# Patient Record
Sex: FEMALE | Race: WHITE | NOT HISPANIC OR LATINO | Employment: FULL TIME | URBAN - METROPOLITAN AREA
[De-identification: names, ages, dates, MRNs, and addresses within clinical notes are randomized per-mention and may not be internally consistent; named-entity substitution may affect disease eponyms.]

---

## 2023-12-20 ENCOUNTER — APPOINTMENT (OUTPATIENT)
Dept: PRIMARY CARE | Facility: CLINIC | Age: 23
End: 2023-12-20
Payer: COMMERCIAL

## 2024-01-03 ASSESSMENT — PROMIS GLOBAL HEALTH SCALE
RATE_GENERAL_HEALTH: VERY GOOD
RATE_QUALITY_OF_LIFE: EXCELLENT
CARRYOUT_SOCIAL_ACTIVITIES: VERY GOOD
RATE_SOCIAL_SATISFACTION: GOOD
RATE_AVERAGE_PAIN: 2
RATE_MENTAL_HEALTH: GOOD
EMOTIONAL_PROBLEMS: OFTEN
RATE_PHYSICAL_HEALTH: VERY GOOD
RATE_AVERAGE_FATIGUE: MILD
CARRYOUT_PHYSICAL_ACTIVITIES: COMPLETELY

## 2024-01-04 ENCOUNTER — LAB (OUTPATIENT)
Dept: LAB | Facility: LAB | Age: 24
End: 2024-01-04
Payer: COMMERCIAL

## 2024-01-04 ENCOUNTER — OFFICE VISIT (OUTPATIENT)
Dept: PRIMARY CARE | Facility: CLINIC | Age: 24
End: 2024-01-04
Payer: COMMERCIAL

## 2024-01-04 ENCOUNTER — TELEPHONE (OUTPATIENT)
Dept: PRIMARY CARE | Facility: CLINIC | Age: 24
End: 2024-01-04

## 2024-01-04 VITALS
RESPIRATION RATE: 18 BRPM | DIASTOLIC BLOOD PRESSURE: 72 MMHG | BODY MASS INDEX: 29.82 KG/M2 | SYSTOLIC BLOOD PRESSURE: 128 MMHG | TEMPERATURE: 98.1 F | OXYGEN SATURATION: 98 % | WEIGHT: 190 LBS | HEART RATE: 90 BPM | HEIGHT: 67 IN

## 2024-01-04 DIAGNOSIS — M79.645 PAIN OF LEFT THUMB: Primary | ICD-10-CM

## 2024-01-04 DIAGNOSIS — F31.81 BIPOLAR 2 DISORDER (MULTI): ICD-10-CM

## 2024-01-04 DIAGNOSIS — Z00.00 ANNUAL PHYSICAL EXAM: Primary | ICD-10-CM

## 2024-01-04 DIAGNOSIS — E78.2 MIXED HYPERCHOLESTEROLEMIA AND HYPERTRIGLYCERIDEMIA: ICD-10-CM

## 2024-01-04 DIAGNOSIS — M79.645 PAIN OF LEFT THUMB: ICD-10-CM

## 2024-01-04 DIAGNOSIS — R53.83 FATIGUE, UNSPECIFIED TYPE: ICD-10-CM

## 2024-01-04 DIAGNOSIS — T14.8XXA AVULSION FRACTURE: ICD-10-CM

## 2024-01-04 DIAGNOSIS — Z11.59 NEED FOR HEPATITIS C SCREENING TEST: ICD-10-CM

## 2024-01-04 DIAGNOSIS — Z23 ENCOUNTER FOR IMMUNIZATION: ICD-10-CM

## 2024-01-04 DIAGNOSIS — L70.0 ACNE VULGARIS: ICD-10-CM

## 2024-01-04 DIAGNOSIS — Z01.89 ENCOUNTER FOR ROUTINE LABORATORY TESTING: ICD-10-CM

## 2024-01-04 LAB
ANION GAP SERPL CALC-SCNC: 12 MMOL/L
BASOPHILS # BLD AUTO: 0.02 X10*3/UL (ref 0–0.1)
BASOPHILS NFR BLD AUTO: 0.3 %
BUN SERPL-MCNC: 11 MG/DL (ref 8–25)
CALCIUM SERPL-MCNC: 9.4 MG/DL (ref 8.5–10.4)
CHLORIDE SERPL-SCNC: 103 MMOL/L (ref 97–107)
CHOLEST SERPL-MCNC: 214 MG/DL (ref 133–200)
CHOLEST/HDLC SERPL: 3.2 {RATIO}
CO2 SERPL-SCNC: 24 MMOL/L (ref 24–31)
CREAT SERPL-MCNC: 0.9 MG/DL (ref 0.4–1.6)
EOSINOPHIL # BLD AUTO: 0.19 X10*3/UL (ref 0–0.7)
EOSINOPHIL NFR BLD AUTO: 2.9 %
ERYTHROCYTE [DISTWIDTH] IN BLOOD BY AUTOMATED COUNT: 12.5 % (ref 11.5–14.5)
GFR SERPL CREATININE-BSD FRML MDRD: >90 ML/MIN/1.73M*2
GLUCOSE SERPL-MCNC: 100 MG/DL (ref 65–99)
HCT VFR BLD AUTO: 40.6 % (ref 36–46)
HDLC SERPL-MCNC: 66 MG/DL
HGB BLD-MCNC: 13.2 G/DL (ref 12–16)
IMM GRANULOCYTES # BLD AUTO: 0.01 X10*3/UL (ref 0–0.7)
IMM GRANULOCYTES NFR BLD AUTO: 0.2 % (ref 0–0.9)
LDLC SERPL CALC-MCNC: 114 MG/DL (ref 65–130)
LYMPHOCYTES # BLD AUTO: 2.11 X10*3/UL (ref 1.2–4.8)
LYMPHOCYTES NFR BLD AUTO: 32.7 %
MCH RBC QN AUTO: 29.1 PG (ref 26–34)
MCHC RBC AUTO-ENTMCNC: 32.5 G/DL (ref 32–36)
MCV RBC AUTO: 89 FL (ref 80–100)
MONOCYTES # BLD AUTO: 0.34 X10*3/UL (ref 0.1–1)
MONOCYTES NFR BLD AUTO: 5.3 %
NEUTROPHILS # BLD AUTO: 3.78 X10*3/UL (ref 1.2–7.7)
NEUTROPHILS NFR BLD AUTO: 58.6 %
NRBC BLD-RTO: 0 /100 WBCS (ref 0–0)
PLATELET # BLD AUTO: 248 X10*3/UL (ref 150–450)
POTASSIUM SERPL-SCNC: 4.7 MMOL/L (ref 3.4–5.1)
RBC # BLD AUTO: 4.54 X10*6/UL (ref 4–5.2)
SODIUM SERPL-SCNC: 139 MMOL/L (ref 133–145)
TRIGL SERPL-MCNC: 169 MG/DL (ref 40–150)
WBC # BLD AUTO: 6.5 X10*3/UL (ref 4.4–11.3)

## 2024-01-04 PROCEDURE — 36415 COLL VENOUS BLD VENIPUNCTURE: CPT

## 2024-01-04 PROCEDURE — 80061 LIPID PANEL: CPT

## 2024-01-04 PROCEDURE — 99395 PREV VISIT EST AGE 18-39: CPT | Performed by: NURSE PRACTITIONER

## 2024-01-04 PROCEDURE — 1036F TOBACCO NON-USER: CPT | Performed by: NURSE PRACTITIONER

## 2024-01-04 PROCEDURE — 85025 COMPLETE CBC W/AUTO DIFF WBC: CPT

## 2024-01-04 PROCEDURE — 90471 IMMUNIZATION ADMIN: CPT | Performed by: NURSE PRACTITIONER

## 2024-01-04 PROCEDURE — 80048 BASIC METABOLIC PNL TOTAL CA: CPT

## 2024-01-04 PROCEDURE — 86803 HEPATITIS C AB TEST: CPT

## 2024-01-04 RX ORDER — SPIRONOLACTONE 100 MG/1
100 TABLET, FILM COATED ORAL DAILY
COMMUNITY
Start: 2023-10-28 | End: 2024-01-04 | Stop reason: SDUPTHER

## 2024-01-04 RX ORDER — SPIRONOLACTONE 100 MG/1
100 TABLET, FILM COATED ORAL DAILY
Qty: 90 TABLET | Refills: 3 | Status: SHIPPED | OUTPATIENT
Start: 2024-01-04 | End: 2024-04-16 | Stop reason: WASHOUT

## 2024-01-04 RX ORDER — DROSPIRENONE AND ETHINYL ESTRADIOL 0.02-3(28)
1 KIT ORAL DAILY
COMMUNITY
Start: 2023-10-22 | End: 2024-02-29 | Stop reason: WASHOUT

## 2024-01-04 RX ORDER — LAMOTRIGINE 100 MG/1
100 TABLET ORAL DAILY
Qty: 90 TABLET | Refills: 1 | Status: SHIPPED | OUTPATIENT
Start: 2024-01-04 | End: 2024-07-02

## 2024-01-04 RX ORDER — LAMOTRIGINE 100 MG/1
100 TABLET ORAL DAILY
COMMUNITY
Start: 2023-07-31 | End: 2024-01-04 | Stop reason: SDUPTHER

## 2024-01-04 RX ORDER — LAMOTRIGINE 100 MG/1
1 TABLET ORAL DAILY
COMMUNITY
Start: 2023-12-04 | End: 2024-01-03 | Stop reason: WASHOUT

## 2024-01-04 RX ORDER — FLUOXETINE HYDROCHLORIDE 20 MG/1
20 CAPSULE ORAL DAILY
Qty: 90 CAPSULE | Refills: 1 | Status: SHIPPED | OUTPATIENT
Start: 2024-01-04 | End: 2024-07-02

## 2024-01-04 RX ORDER — FLUOXETINE HYDROCHLORIDE 20 MG/1
20 CAPSULE ORAL DAILY
COMMUNITY
End: 2024-01-04 | Stop reason: SDUPTHER

## 2024-01-04 ASSESSMENT — ENCOUNTER SYMPTOMS
SHORTNESS OF BREATH: 0
BRUISES/BLEEDS EASILY: 0
BLOOD IN STOOL: 0
COUGH: 0
PALPITATIONS: 0
APPETITE CHANGE: 0
DIZZINESS: 0
CHILLS: 0
CONFUSION: 0
DIAPHORESIS: 0
FATIGUE: 0
HEADACHES: 0
CHEST TIGHTNESS: 0
LOSS OF SENSATION IN FEET: 0
HEMATURIA: 0
DYSURIA: 0
DEPRESSION: 0
AGITATION: 0
WOUND: 0
ABDOMINAL PAIN: 0
VOMITING: 0
SPEECH DIFFICULTY: 0
BACK PAIN: 0
NECK PAIN: 0
NAUSEA: 0
SEIZURES: 0
OCCASIONAL FEELINGS OF UNSTEADINESS: 0
POLYDIPSIA: 0
ADENOPATHY: 0
POLYPHAGIA: 0
FLANK PAIN: 0
FEVER: 0
WHEEZING: 0

## 2024-01-04 ASSESSMENT — PATIENT HEALTH QUESTIONNAIRE - PHQ9
SUM OF ALL RESPONSES TO PHQ9 QUESTIONS 1 AND 2: 0
1. LITTLE INTEREST OR PLEASURE IN DOING THINGS: NOT AT ALL
2. FEELING DOWN, DEPRESSED OR HOPELESS: NOT AT ALL

## 2024-01-04 ASSESSMENT — PAIN SCALES - GENERAL: PAINLEVEL: 6

## 2024-01-04 NOTE — PROGRESS NOTES
[facility]: [facility]  PHYSICAL EXAM      Neelima Cordova is a 23 y.o. female that is presenting today for Annual Exam.    Patient reports she was diagnosed with Bipolar 2 while living in [place]. She is inquiring about referral to psychiatry to continue follow up.    Assessment/Plan   Diagnoses and all orders for this visit:  Annual physical exam  Encounter for immunization  -     Tdap vaccine, age 7 years and older  Need for hepatitis C screening test  -     Hepatitis C antibody; Future  Mixed hypercholesterolemia and hypertriglyceridemia  -     Lipid Panel; Future  Fatigue, unspecified type  -     CBC and Auto Differential; Future  -     Basic Metabolic Panel; Future  Bipolar 2 disorder (CMS/HCC)  -     continue FLUoxetine (PROzac) 20 mg capsule; Take 1 capsule (20 mg) by mouth once daily.  -     continue lamoTRIgine (LaMICtal) 100 mg tablet; Take 1 tablet (100 mg) by mouth once daily.  -     Referral to Psychiatry; Future  Acne vulgaris  -     continue spironolactone (Aldactone) 100 mg tablet; Take 1 tablet (100 mg) by mouth once daily.  Encounter for routine laboratory testing  -     CBC and Auto Differential; Future  -     Basic Metabolic Panel; Future  -     Lipid Panel; Future  Pain of left thumb  -     XR hand left 1-2 views; Future  -     Referral to Orthopaedic Surgery; Future  Other orders  -     Follow Up In Primary Care - Health Maintenance; Future  Subjective   Patient reports gradual onset of left thumb pain over the past 2 months with progressive worsening. Denies trauma or injury. Pain is at base of thumb and worsens with movement, ROM limited due to pain. She has tried OTC Advil occasionally without relief.      Review of Systems   Constitutional:  Negative for appetite change, chills, diaphoresis, fatigue and fever.   HENT:  Negative for hearing loss and mouth sores.    Eyes:  Negative for visual disturbance.   Respiratory:  Negative for cough, chest tightness,  shortness of breath and wheezing.    Cardiovascular:  Negative for chest pain, palpitations and leg swelling.   Gastrointestinal:  Negative for abdominal pain, blood in stool, nausea and vomiting.   Endocrine: Negative for cold intolerance, heat intolerance, polydipsia, polyphagia and polyuria.   Genitourinary:  Negative for dysuria, flank pain and hematuria.   Musculoskeletal:  Negative for back pain and neck pain.   Skin:  Negative for rash and wound.   Allergic/Immunologic: Negative for food allergies and immunocompromised state.   Neurological:  Negative for dizziness, seizures, syncope, speech difficulty and headaches.   Hematological:  Negative for adenopathy. Does not bruise/bleed easily.   Psychiatric/Behavioral:  Negative for agitation and confusion.       Objective   Vitals:    01/04/24 1456   BP: 128/72   Pulse: 90   Resp: 18   Temp: 36.7 °C (98.1 °F)   SpO2: 98%     Body mass index is 29.76 kg/m².  Physical Exam  Vitals and nursing note reviewed.   Constitutional:       General: She is not in acute distress.     Appearance: Normal appearance. She is not ill-appearing.   HENT:      Head: Normocephalic and atraumatic.      Right Ear: Tympanic membrane, ear canal and external ear normal. There is no impacted cerumen.      Left Ear: Tympanic membrane, ear canal and external ear normal. There is no impacted cerumen.      Nose: Nose normal.      Mouth/Throat:      Mouth: Mucous membranes are moist.      Pharynx: Oropharynx is clear. No oropharyngeal exudate or posterior oropharyngeal erythema.   Eyes:      General: No scleral icterus.        Right eye: No discharge.         Left eye: No discharge.      Extraocular Movements: Extraocular movements intact.      Conjunctiva/sclera: Conjunctivae normal.      Pupils: Pupils are equal, round, and reactive to light.   Neck:      Vascular: No carotid bruit.   Cardiovascular:      Rate and Rhythm: Normal rate and regular rhythm.      Pulses: Normal pulses.      Heart  "sounds: Normal heart sounds. No murmur heard.     No friction rub. No gallop.   Pulmonary:      Effort: Pulmonary effort is normal. No respiratory distress.      Breath sounds: Normal breath sounds.   Abdominal:      General: Abdomen is flat. Bowel sounds are normal. There is no distension.      Palpations: Abdomen is soft.      Tenderness: There is no abdominal tenderness.      Hernia: No hernia is present.   Musculoskeletal:         General: Tenderness present. No swelling, deformity or signs of injury.      Right hand: Normal.      Left hand: Tenderness and bony tenderness present. No swelling, deformity or lacerations. Decreased range of motion. Decreased strength of finger abduction. Normal sensation. Normal capillary refill. Normal pulse.        Arms:       Comments: Pain and tenderness at left MCP joint. Limited ROM and decreased strength with    Lymphadenopathy:      Cervical: No cervical adenopathy.   Skin:     General: Skin is warm and dry.      Coloration: Skin is not jaundiced.      Findings: No rash.   Neurological:      General: No focal deficit present.      Mental Status: She is alert and oriented to person, place, and time. Mental status is at baseline.   Psychiatric:         Mood and Affect: Mood normal.         Behavior: Behavior normal.       Diagnostic Results   Lab Results   Component Value Date    GLUCOSE 94 11/01/2021    CALCIUM 9.3 11/01/2021     11/01/2021    K 4.2 11/01/2021    CO2 23 (L) 11/01/2021     11/01/2021    BUN 11 11/01/2021    CREATININE 0.8 11/01/2021     No results found for: \"ALT\", \"AST\", \"GGT\", \"ALKPHOS\", \"BILITOT\"  Lab Results   Component Value Date    WBC 7.6 11/01/2021    HGB 14.0 11/01/2021    HCT 43.1 11/01/2021    MCV 88.9 11/01/2021     11/01/2021     No results found for: \"CHOL\"  No results found for: \"HDL\"  No results found for: \"LDLCALC\"  No results found for: \"TRIG\"  No components found for: \"CHOLHDL\"  No results found for: \"HGBA1C\"  Other " labs not included in the list above were reviewed either before or during this encounter.    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    Piriformis syndrome of left side    New daily persistent headache (ndph) 02/13/2017    New daily persistent headache    Other conditions influencing health status     Normal menstrual period    Other conditions influencing health status 05/01/2017    History of cough    Pain in thoracic spine 08/11/2017    Pain in thoracic spine    Personal history of diseases of the blood and blood-forming organs and certain disorders involving the immune mechanism 11/04/2017    History of iron deficiency anemia    Personal history of other (healed) physical injury and trauma 08/18/2017    History of strain of back    Personal history of other diseases of the digestive system 04/20/2015    History of constipation    Personal history of other diseases of the female genital tract 05/25/2016    History of abnormal uterine bleeding    Personal history of other diseases of the female genital tract 12/12/2016    History of vaginal pruritus    Personal history of other diseases of the female genital tract 01/19/2016    History of menorrhagia    Personal history of other diseases of the musculoskeletal system and connective tissue 10/05/2017    History of low back pain    Personal history of other diseases of the nervous system and sense organs 01/30/2015    History of acute conjunctivitis    Personal history of  other diseases of the respiratory system 11/20/2015    History of sore throat    Personal history of other infectious and parasitic diseases 12/12/2016    History of candidal vulvovaginitis    Personal history of other specified conditions 05/01/2017    History of nasal congestion    Personal history of other specified conditions 12/12/2016    History of dysuria    Personal history of other specified conditions 12/15/2016    History of nausea    Personal history of other specified conditions 12/18/2015    History of fatigue    Pneumonia, unspecified organism 10/16/2016    Community acquired pneumonia    Presence of spectacles and contact lenses     Wears glasses    Segmental and somatic dysfunction of abdomen and other regions 08/11/2017    Segmental and somatic dysfunction of abdomen and other regions    Segmental and somatic dysfunction of cervical region 08/11/2017    Cervical somatic dysfunction    Segmental and somatic dysfunction of head region 08/11/2017    Cranial somatic dysfunction    Segmental and somatic dysfunction of rib cage 10/05/2017    Rib cage region somatic dysfunction    Segmental and somatic dysfunction of sacral region 10/05/2017    Segmental and somatic dysfunction of sacral region    Segmental and somatic dysfunction of thoracic region 10/05/2017    Segmental and somatic dysfunction of thoracic region    Segmental and somatic dysfunction of upper extremity 10/05/2017    Segmental and somatic dysfunction of upper extremity    Spondylosis without myelopathy or radiculopathy, lumbar region 10/02/2014    Facet syndrome, lumbar    Spondylosis without myelopathy or radiculopathy, lumbosacral region 05/16/2017    Lumbosacral spondylosis    Toxic effect of venom of bees, accidental (unintentional), initial encounter 06/23/2016    Bee sting reaction    Unspecified injury of left lower leg, initial encounter 09/26/2016    Knee injury, left, initial encounter     Past Surgical History:   Procedure  Laterality Date    OTHER SURGICAL HISTORY  12/31/2019    No history of surgery     No family history on file.  Social History     Socioeconomic History    Marital status: Single     Spouse name: Not on file    Number of children: Not on file    Years of education: Not on file    Highest education level: Not on file   Occupational History    Not on file   Tobacco Use    Smoking status: Never    Smokeless tobacco: Never   Substance and Sexual Activity    Alcohol use: Not on file    Drug use: Not on file    Sexual activity: Not on file   Other Topics Concern    Not on file   Social History Narrative    Not on file     Social Determinants of Health     Financial Resource Strain: Not on file   Food Insecurity: Not on file   Transportation Needs: Not on file   Physical Activity: Not on file   Stress: Not on file   Social Connections: Not on file   Intimate Partner Violence: Not on file   Housing Stability: Not on file     Allergies   Allergen Reactions    Benadryl Allergy Decongestant Anaphylaxis     Current Outpatient Medications on File Prior to Visit   Medication Sig Dispense Refill    FLUoxetine (PROzac) 20 mg capsule Take 1 capsule (20 mg) by mouth once daily.      lamoTRIgine (LaMICtal) 100 mg tablet Take 1 tablet (100 mg) by mouth once daily.      Taylor, 28, 3-0.02 mg tablet Take 1 tablet by mouth once daily.      spironolactone (Aldactone) 100 mg tablet Take 1 tablet (100 mg) by mouth once daily.      [DISCONTINUED] lamoTRIgine (LaMICtal) 100 mg tablet Take 1 tablet (100 mg) by mouth once daily.       No current facility-administered medications on file prior to visit.       There is no immunization history on file for this patient.  Patient's medical history was reviewed and updated either before or during this encounter.       Silvia Garcia, APRN-CNP

## 2024-01-04 NOTE — PROGRESS NOTES
"[facility]: [facility]  PROGRESS NOTE      Neelima Cordova is a 23 y.o. female that is presenting today for Annual Exam.    Assessment/Plan     Subjective   HPI  Review of Systems   Objective   Vitals:    01/04/24 1456   BP: 128/72   Pulse: 90   Resp: 18   Temp: 36.7 °C (98.1 °F)   SpO2: 98%      Body mass index is 29.76 kg/m².  Physical Exam  Diagnostic Results   Lab Results   Component Value Date    GLUCOSE 94 11/01/2021    CALCIUM 9.3 11/01/2021     11/01/2021    K 4.2 11/01/2021    CO2 23 (L) 11/01/2021     11/01/2021    BUN 11 11/01/2021    CREATININE 0.8 11/01/2021     No results found for: \"ALT\", \"AST\", \"GGT\", \"ALKPHOS\", \"BILITOT\"  Lab Results   Component Value Date    WBC 7.6 11/01/2021    HGB 14.0 11/01/2021    HCT 43.1 11/01/2021    MCV 88.9 11/01/2021     11/01/2021     No results found for: \"CHOL\"  No results found for: \"HDL\"  No results found for: \"LDLCALC\"  No results found for: \"TRIG\"  No components found for: \"CHOLHDL\"  No results found for: \"HGBA1C\"  Other labs not included in the list above were reviewed either before or during this encounter.    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    Piriformis syndrome of left side    New daily persistent headache (ndph) 02/13/2017    New daily persistent headache    Other conditions influencing health status     Normal menstrual period    Other conditions influencing health status 05/01/2017    History of cough    Pain in " thoracic spine 08/11/2017    Pain in thoracic spine    Personal history of diseases of the blood and blood-forming organs and certain disorders involving the immune mechanism 11/04/2017    History of iron deficiency anemia    Personal history of other (healed) physical injury and trauma 08/18/2017    History of strain of back    Personal history of other diseases of the digestive system 04/20/2015    History of constipation    Personal history of other diseases of the female genital tract 05/25/2016    History of abnormal uterine bleeding    Personal history of other diseases of the female genital tract 12/12/2016    History of vaginal pruritus    Personal history of other diseases of the female genital tract 01/19/2016    History of menorrhagia    Personal history of other diseases of the musculoskeletal system and connective tissue 10/05/2017    History of low back pain    Personal history of other diseases of the nervous system and sense organs 01/30/2015    History of acute conjunctivitis    Personal history of other diseases of the respiratory system 11/20/2015    History of sore throat    Personal history of other infectious and parasitic diseases 12/12/2016    History of candidal vulvovaginitis    Personal history of other specified conditions 05/01/2017    History of nasal congestion    Personal history of other specified conditions 12/12/2016    History of dysuria    Personal history of other specified conditions 12/15/2016    History of nausea    Personal history of other specified conditions 12/18/2015    History of fatigue    Pneumonia, unspecified organism 10/16/2016    Community acquired pneumonia    Presence of spectacles and contact lenses     Wears glasses    Segmental and somatic dysfunction of abdomen and other regions 08/11/2017    Segmental and somatic dysfunction of abdomen and other regions    Segmental and somatic dysfunction of cervical region 08/11/2017    Cervical somatic dysfunction     Segmental and somatic dysfunction of head region 08/11/2017    Cranial somatic dysfunction    Segmental and somatic dysfunction of rib cage 10/05/2017    Rib cage region somatic dysfunction    Segmental and somatic dysfunction of sacral region 10/05/2017    Segmental and somatic dysfunction of sacral region    Segmental and somatic dysfunction of thoracic region 10/05/2017    Segmental and somatic dysfunction of thoracic region    Segmental and somatic dysfunction of upper extremity 10/05/2017    Segmental and somatic dysfunction of upper extremity    Spondylosis without myelopathy or radiculopathy, lumbar region 10/02/2014    Facet syndrome, lumbar    Spondylosis without myelopathy or radiculopathy, lumbosacral region 05/16/2017    Lumbosacral spondylosis    Toxic effect of venom of bees, accidental (unintentional), initial encounter 06/23/2016    Bee sting reaction    Unspecified injury of left lower leg, initial encounter 09/26/2016    Knee injury, left, initial encounter     Past Surgical History:   Procedure Laterality Date    OTHER SURGICAL HISTORY  12/31/2019    No history of surgery     No family history on file.  Social History     Socioeconomic History    Marital status: Single     Spouse name: Not on file    Number of children: Not on file    Years of education: Not on file    Highest education level: Not on file   Occupational History    Not on file   Tobacco Use    Smoking status: Never    Smokeless tobacco: Never   Substance and Sexual Activity    Alcohol use: Not on file    Drug use: Not on file    Sexual activity: Not on file   Other Topics Concern    Not on file   Social History Narrative    Not on file     Social Determinants of Health     Financial Resource Strain: Not on file   Food Insecurity: Not on file   Transportation Needs: Not on file   Physical Activity: Not on file   Stress: Not on file   Social Connections: Not on file   Intimate Partner Violence: Not on file   Housing Stability: Not on  file     Allergies   Allergen Reactions    Benadryl Allergy Decongestant Anaphylaxis     Current Outpatient Medications on File Prior to Visit   Medication Sig Dispense Refill    FLUoxetine (PROzac) 20 mg capsule Take 1 capsule (20 mg) by mouth once daily.      lamoTRIgine (LaMICtal) 100 mg tablet Take 1 tablet (100 mg) by mouth once daily.      Taylor, 28, 3-0.02 mg tablet Take 1 tablet by mouth once daily.      spironolactone (Aldactone) 100 mg tablet Take 1 tablet (100 mg) by mouth once daily.      [DISCONTINUED] lamoTRIgine (LaMICtal) 100 mg tablet Take 1 tablet (100 mg) by mouth once daily.       No current facility-administered medications on file prior to visit.       There is no immunization history on file for this patient.  Patient's medical history was reviewed and updated either before or during this encounter.       Silvia Garcia, APRN-CNP

## 2024-01-05 LAB — HCV AB SER QL: NONREACTIVE

## 2024-01-06 ENCOUNTER — ANCILLARY PROCEDURE (OUTPATIENT)
Dept: RADIOLOGY | Facility: CLINIC | Age: 24
End: 2024-01-06
Payer: COMMERCIAL

## 2024-01-06 DIAGNOSIS — M79.645 PAIN OF LEFT THUMB: ICD-10-CM

## 2024-01-06 PROCEDURE — 73130 X-RAY EXAM OF HAND: CPT | Mod: LEFT SIDE | Performed by: RADIOLOGY

## 2024-01-06 PROCEDURE — 73130 X-RAY EXAM OF HAND: CPT | Mod: LT

## 2024-01-08 NOTE — TELEPHONE ENCOUNTER
----- Message from DIA Omalley-CNP sent at 1/8/2024  9:37 AM EST -----  Tiny avulsion fx as noted. Refer to Dr. Lyric De La Paz to manage. Thank you.

## 2024-02-01 ENCOUNTER — PATIENT MESSAGE (OUTPATIENT)
Dept: PRIMARY CARE | Facility: CLINIC | Age: 24
End: 2024-02-01
Payer: COMMERCIAL

## 2024-02-01 DIAGNOSIS — F31.81 BIPOLAR 2 DISORDER (MULTI): ICD-10-CM

## 2024-02-01 NOTE — TELEPHONE ENCOUNTER
From: Neelima Cordova  To: Silvia Garcia, APRN-CNP  Sent: 2/1/2024 3:53 PM EST  Subject: Refill Issue    Hi,   I’ve been waiting around for a text from [facility] about my RX being ready so I finally stopped in to ask what was going on and they said they cannot fill my Lamitrogine RX and said they had a call out to your office. I am all out of this medicine now and have tried calling your office but end up sitting on hold with no answer. Let me know if i need to come in to get the prescription filled somewhere else or anything! Thanks for your help!    Ally

## 2024-02-07 RX ORDER — LAMOTRIGINE 100 MG/1
100 TABLET ORAL DAILY
Qty: 90 TABLET | Refills: 3 | OUTPATIENT
Start: 2024-02-07

## 2024-02-29 ENCOUNTER — OFFICE VISIT (OUTPATIENT)
Dept: PRIMARY CARE | Facility: EXTERNAL LOCATION | Age: 24
End: 2024-02-29

## 2024-02-29 VITALS
RESPIRATION RATE: 17 BRPM | OXYGEN SATURATION: 99 % | TEMPERATURE: 98 F | SYSTOLIC BLOOD PRESSURE: 117 MMHG | DIASTOLIC BLOOD PRESSURE: 74 MMHG | HEART RATE: 97 BPM

## 2024-02-29 DIAGNOSIS — R05.1 ACUTE COUGH: Primary | ICD-10-CM

## 2024-02-29 RX ORDER — BENZONATATE 100 MG/1
100 CAPSULE ORAL 3 TIMES DAILY PRN
Qty: 30 CAPSULE | Refills: 0 | Status: SHIPPED | OUTPATIENT
Start: 2024-02-29 | End: 2024-03-10

## 2024-02-29 RX ORDER — PREDNISONE 20 MG/1
40 TABLET ORAL DAILY
Qty: 10 TABLET | Refills: 0 | Status: SHIPPED | OUTPATIENT
Start: 2024-02-29 | End: 2024-03-05

## 2024-02-29 RX ORDER — ALBUTEROL SULFATE 90 UG/1
2 AEROSOL, METERED RESPIRATORY (INHALATION) EVERY 4 HOURS PRN
Qty: 8.5 G | Refills: 0 | Status: SHIPPED | OUTPATIENT
Start: 2024-02-29 | End: 2024-04-16 | Stop reason: WASHOUT

## 2024-02-29 ASSESSMENT — ENCOUNTER SYMPTOMS
FATIGUE: 0
HEADACHES: 1
WHEEZING: 1
SINUS PAIN: 0
SINUS PRESSURE: 0
TROUBLE SWALLOWING: 0
HEARTBURN: 0
VOICE CHANGE: 0
COUGH: 1
RHINORRHEA: 0
CHILLS: 0
APPETITE CHANGE: 0
HEMOPTYSIS: 0
SORE THROAT: 0
EYE PAIN: 0
EYE ITCHING: 0
DIARRHEA: 1
PSYCHIATRIC NEGATIVE: 1
ACTIVITY CHANGE: 0
SHORTNESS OF BREATH: 1
VOMITING: 0
EYE REDNESS: 0
MYALGIAS: 0
FEVER: 0
NAUSEA: 0

## 2024-02-29 ASSESSMENT — COPD QUESTIONNAIRES: COPD: 0

## 2024-02-29 NOTE — PATIENT INSTRUCTIONS
Start medications as ordered.   Will try antihistamine.   If s/s worsen please follow up with PCP.

## 2024-02-29 NOTE — PROGRESS NOTES
Subjective   Patient ID: Neelima Cordova is a 24 y.o. female who presents for Cough (Approx 4 months with pressure along back of head).    Has had severe PNA in the past. Was in ICU. Cough x a couple months. Dry. Worse with lying down. Wheezing at night. Coughing fits. Works as a teacher.           URI   The current episode started more than 1 month ago. There has been no fever. Associated symptoms include coughing, diarrhea, headaches, a plugged ear sensation and wheezing. Pertinent negatives include no chest pain, congestion, ear pain, nausea, rhinorrhea, sinus pain, sneezing, sore throat or vomiting. She has tried NSAIDs and acetaminophen for the symptoms. The treatment provided no relief.   Cough  The current episode started more than 1 month ago. The problem has been unchanged. The problem occurs every few minutes. The cough is Non-productive. Associated symptoms include headaches, shortness of breath and wheezing. Pertinent negatives include no chest pain, chills, ear congestion, ear pain, eye redness, fever, heartburn, hemoptysis, myalgias, nasal congestion, postnasal drip, rhinorrhea or sore throat. The symptoms are aggravated by lying down. She has tried nothing for the symptoms. Her past medical history is significant for pneumonia. There is no history of asthma, COPD or environmental allergies.        Review of Systems   Constitutional:  Negative for activity change, appetite change, chills, fatigue and fever.   HENT:  Negative for congestion, ear pain, postnasal drip, rhinorrhea, sinus pressure, sinus pain, sneezing, sore throat, trouble swallowing and voice change.    Eyes:  Negative for pain, redness and itching.   Respiratory:  Positive for cough, shortness of breath and wheezing. Negative for hemoptysis.    Cardiovascular:  Negative for chest pain.   Gastrointestinal:  Positive for diarrhea. Negative for heartburn, nausea and vomiting.   Musculoskeletal:  Negative for myalgias.    Allergic/Immunologic: Negative for environmental allergies.   Neurological:  Positive for headaches.   Psychiatric/Behavioral: Negative.         Objective   /74   Pulse 97   Temp 36.7 °C (98 °F)   Resp 17   LMP 02/03/2024 (Exact Date)   SpO2 99%     Physical Exam  Vitals and nursing note reviewed.   Constitutional:       General: She is not in acute distress.     Appearance: Normal appearance.   HENT:      Head: Normocephalic.      Right Ear: Tympanic membrane, ear canal and external ear normal. There is no impacted cerumen.      Left Ear: Tympanic membrane, ear canal and external ear normal. There is no impacted cerumen.      Nose: Nose normal. No congestion or rhinorrhea.      Mouth/Throat:      Mouth: Mucous membranes are moist.      Pharynx: Oropharynx is clear. No oropharyngeal exudate or posterior oropharyngeal erythema.   Eyes:      Conjunctiva/sclera: Conjunctivae normal.   Cardiovascular:      Rate and Rhythm: Normal rate and regular rhythm.      Heart sounds: No murmur heard.  Pulmonary:      Effort: Pulmonary effort is normal. No respiratory distress.      Breath sounds: Normal breath sounds. No stridor. No wheezing, rhonchi or rales.   Musculoskeletal:         General: No swelling.      Cervical back: Normal range of motion and neck supple. No rigidity or tenderness.      Right lower leg: No edema.      Left lower leg: No edema.   Lymphadenopathy:      Cervical: No cervical adenopathy.   Skin:     General: Skin is warm and dry.      Capillary Refill: Capillary refill takes less than 2 seconds.   Neurological:      General: No focal deficit present.      Mental Status: She is alert. Mental status is at baseline.      Sensory: No sensory deficit.      Coordination: Coordination normal.   Psychiatric:         Mood and Affect: Mood normal.         Behavior: Behavior normal.         Thought Content: Thought content normal.         Judgment: Judgment normal.         Assessment/Plan   Diagnoses and  all orders for this visit:  Acute cough  -     benzonatate (Tessalon) 100 mg capsule; Take 1 capsule (100 mg) by mouth 3 times a day as needed for cough for up to 10 days. Do not crush or chew.  -     albuterol (ProAir HFA) 90 mcg/actuation inhaler; Inhale 2 puffs every 4 hours if needed for wheezing or shortness of breath for up to 10 days.  -     predniSONE (Deltasone) 20 mg tablet; Take 2 tablets (40 mg) by mouth once daily for 5 days.    Start medications as ordered.   Will try antihistamine.   If s/s worsen please follow up with PCP.

## 2024-04-16 ENCOUNTER — OFFICE VISIT (OUTPATIENT)
Dept: PRIMARY CARE | Facility: EXTERNAL LOCATION | Age: 24
End: 2024-04-16

## 2024-04-16 VITALS
HEIGHT: 67 IN | SYSTOLIC BLOOD PRESSURE: 132 MMHG | BODY MASS INDEX: 31.08 KG/M2 | RESPIRATION RATE: 17 BRPM | WEIGHT: 198 LBS | OXYGEN SATURATION: 99 % | DIASTOLIC BLOOD PRESSURE: 72 MMHG | TEMPERATURE: 97.9 F | HEART RATE: 91 BPM

## 2024-04-16 DIAGNOSIS — Z02.1 PHYSICAL EXAM, PRE-EMPLOYMENT: Primary | ICD-10-CM

## 2024-04-16 LAB
POC APPEARANCE, URINE: CLEAR
POC BILIRUBIN, URINE: NEGATIVE
POC BLOOD, URINE: ABNORMAL
POC COLOR, URINE: YELLOW
POC GLUCOSE, URINE: NEGATIVE MG/DL
POC KETONES, URINE: NEGATIVE MG/DL
POC LEUKOCYTES, URINE: NEGATIVE
POC NITRITE,URINE: NEGATIVE
POC PH, URINE: 6 PH
POC PROTEIN, URINE: NEGATIVE MG/DL
POC SPECIFIC GRAVITY, URINE: 1.02
POC UROBILINOGEN, URINE: 0.2 EU/DL

## 2024-04-16 ASSESSMENT — ENCOUNTER SYMPTOMS
VOMITING: 0
EYE PAIN: 0
BRUISES/BLEEDS EASILY: 0
JOINT SWELLING: 0
MYALGIAS: 0
NECK PAIN: 0
HEMATURIA: 0
NUMBNESS: 0
EYE REDNESS: 0
SINUS PRESSURE: 0
CHILLS: 0
PALPITATIONS: 0
SPEECH DIFFICULTY: 0
WHEEZING: 0
TREMORS: 0
COUGH: 0
NAUSEA: 0
SHORTNESS OF BREATH: 0
ACTIVITY CHANGE: 0
DIFFICULTY URINATING: 0
BACK PAIN: 0
HEADACHES: 0
DYSURIA: 0
PSYCHIATRIC NEGATIVE: 1
ARTHRALGIAS: 0
CONSTIPATION: 0
DYSPHORIC MOOD: 0
RHINORRHEA: 0
APPETITE CHANGE: 0
SINUS PAIN: 0
ABDOMINAL PAIN: 0
VOICE CHANGE: 0
DIZZINESS: 0
NECK STIFFNESS: 0
LIGHT-HEADEDNESS: 0
DIARRHEA: 0
FEVER: 0
SORE THROAT: 0
FATIGUE: 0
COLOR CHANGE: 0

## 2024-04-16 ASSESSMENT — VISUAL ACUITY
OS_CC: 20/25
OD_CC: 20/20

## 2024-04-16 NOTE — PROGRESS NOTES
"Subjective   Patient ID: Neelima Cordova is a 24 y.o. female who presents for Employment Physical.    Pt presents for a pre-employment physical prior to starting a job as . Recently moved back to [place]. Was seen=ing PCP in [place]. Has had pap in last 3 years. Nonsmoker.        Review of Systems   Constitutional:  Negative for activity change, appetite change, chills, fatigue and fever.   HENT:  Negative for congestion, ear pain, postnasal drip, rhinorrhea, sinus pressure, sinus pain, sneezing, sore throat and voice change.    Eyes:  Negative for pain, redness and visual disturbance.   Respiratory:  Negative for cough, shortness of breath and wheezing.    Cardiovascular:  Negative for chest pain, palpitations and leg swelling.   Gastrointestinal:  Negative for abdominal pain, constipation, diarrhea, nausea and vomiting.   Endocrine: Negative for cold intolerance, heat intolerance and polyuria.   Genitourinary:  Negative for decreased urine volume, difficulty urinating, dysuria and hematuria.   Musculoskeletal:  Negative for arthralgias, back pain, gait problem, joint swelling, myalgias, neck pain and neck stiffness.   Skin:  Negative for color change, pallor and rash.   Neurological:  Negative for dizziness, tremors, speech difficulty, light-headedness, numbness and headaches.   Hematological:  Does not bruise/bleed easily.   Psychiatric/Behavioral: Negative.  Negative for dysphoric mood and suicidal ideas.        Objective   /72   Pulse 91   Temp 36.6 °C (97.9 °F)   Resp 17   Ht 1.702 m (5' 7\")   Wt 89.8 kg (198 lb)   SpO2 99%   BMI 31.01 kg/m²     Physical Exam  Vitals and nursing note reviewed.   Constitutional:       General: She is not in acute distress.     Appearance: Normal appearance.   HENT:      Head: Normocephalic.      Right Ear: Tympanic membrane, ear canal and external ear normal. There is no impacted cerumen.      Left Ear: Tympanic membrane, ear canal and " external ear normal. There is no impacted cerumen.      Nose: Nose normal. No congestion or rhinorrhea.      Mouth/Throat:      Mouth: Mucous membranes are moist.      Pharynx: Oropharynx is clear. No oropharyngeal exudate or posterior oropharyngeal erythema.   Eyes:      Extraocular Movements: Extraocular movements intact.      Conjunctiva/sclera: Conjunctivae normal.      Pupils: Pupils are equal, round, and reactive to light.   Neck:      Vascular: No carotid bruit.   Cardiovascular:      Rate and Rhythm: Normal rate and regular rhythm.      Heart sounds: No murmur heard.  Pulmonary:      Effort: Pulmonary effort is normal. No respiratory distress.      Breath sounds: Normal breath sounds. No wheezing.   Abdominal:      General: Abdomen is flat. Bowel sounds are normal. There is no distension.      Palpations: Abdomen is soft. There is no mass.      Tenderness: There is no abdominal tenderness. There is no right CVA tenderness, left CVA tenderness, guarding or rebound.   Musculoskeletal:      Cervical back: Normal range of motion and neck supple. No rigidity or tenderness.      Right lower leg: No edema.      Left lower leg: No edema.   Lymphadenopathy:      Cervical: No cervical adenopathy.   Skin:     General: Skin is warm and dry.      Capillary Refill: Capillary refill takes less than 2 seconds.   Neurological:      General: No focal deficit present.      Mental Status: She is alert.      Sensory: No sensory deficit.      Coordination: Coordination normal.   Psychiatric:         Mood and Affect: Mood normal.         Behavior: Behavior normal.         Thought Content: Thought content normal.         Judgment: Judgment normal.       Assessment/Plan   Diagnoses and all orders for this visit:  Physical exam, pre-employment  -     POCT UA (nonautomated) manually resulted    Generally physically fit for employment.  No job description available.  Follow up with your PCP  for annual preventative exams and as needed.

## 2024-05-22 PROBLEM — M99.06 SEGMENTAL AND SOMATIC DYSFUNCTION OF LOWER EXTREMITY: Status: ACTIVE | Noted: 2024-05-22

## 2024-05-22 PROBLEM — G44.52 NEW DAILY PERSISTENT HEADACHE: Status: ACTIVE | Noted: 2024-05-22

## 2024-05-22 PROBLEM — Z02.1 PHYSICAL EXAM, PRE-EMPLOYMENT: Status: RESOLVED | Noted: 2022-08-24 | Resolved: 2024-05-22

## 2024-05-22 PROBLEM — J01.00 ACUTE MAXILLARY SINUSITIS: Status: RESOLVED | Noted: 2024-05-22 | Resolved: 2024-05-22

## 2024-05-22 PROBLEM — D50.9 IRON DEFICIENCY ANEMIA: Status: ACTIVE | Noted: 2024-05-22

## 2024-05-22 PROBLEM — J02.9 SORE THROAT: Status: RESOLVED | Noted: 2024-05-22 | Resolved: 2024-05-22

## 2024-05-22 PROBLEM — J35.1 HYPERTROPHY OF TONSILS: Status: ACTIVE | Noted: 2024-05-22

## 2024-05-22 PROBLEM — M99.03 LUMBAR REGION SOMATIC DYSFUNCTION: Status: ACTIVE | Noted: 2024-05-22

## 2024-05-22 PROBLEM — E78.2 MIXED HYPERCHOLESTEROLEMIA AND HYPERTRIGLYCERIDEMIA: Status: ACTIVE | Noted: 2024-01-04

## 2024-05-22 PROBLEM — N93.9 ABNORMAL UTERINE BLEEDING (AUB): Status: ACTIVE | Noted: 2024-05-22

## 2024-05-22 PROBLEM — M79.645 PAIN OF LEFT THUMB: Status: ACTIVE | Noted: 2024-05-22

## 2024-05-22 PROBLEM — R42 DIZZINESS: Status: ACTIVE | Noted: 2024-05-22

## 2024-05-22 PROBLEM — R05.1 ACUTE COUGH: Status: RESOLVED | Noted: 2024-02-29 | Resolved: 2024-05-22

## 2024-05-22 PROBLEM — Z30.42 ENCOUNTER FOR DEPO-PROVERA CONTRACEPTION: Status: RESOLVED | Noted: 2024-05-22 | Resolved: 2024-05-22

## 2024-05-22 PROBLEM — M99.05 PELVIC REGION SOMATIC DYSFUNCTION: Status: ACTIVE | Noted: 2024-05-22

## 2024-05-22 PROBLEM — M47.817 SPONDYLOSIS OF LUMBOSACRAL REGION: Status: ACTIVE | Noted: 2024-05-22

## 2024-05-22 PROBLEM — M62.838 TRAPEZIUS MUSCLE SPASM: Status: ACTIVE | Noted: 2024-05-22

## 2024-05-22 PROBLEM — L70.0 ACNE VULGARIS: Status: ACTIVE | Noted: 2024-05-22

## 2024-05-22 PROBLEM — R53.83 FATIGUE: Status: ACTIVE | Noted: 2024-01-04

## 2024-05-22 PROBLEM — J32.9 SINUSITIS: Status: RESOLVED | Noted: 2024-05-22 | Resolved: 2024-05-22

## 2024-05-22 PROBLEM — F32.A DEPRESSION: Status: ACTIVE | Noted: 2024-05-22

## 2024-05-22 RX ORDER — MEDROXYPROGESTERONE ACETATE 150 MG/ML
INJECTION, SUSPENSION INTRAMUSCULAR
COMMUNITY
Start: 2019-12-31

## 2024-05-22 RX ORDER — CARIPRAZINE 1.5 MG/1
1.5 CAPSULE, GELATIN COATED ORAL DAILY
COMMUNITY
Start: 2023-07-10 | End: 2023-10-08 | Stop reason: WASHOUT

## 2024-05-22 RX ORDER — TUBERCULIN PURIFIED PROTEIN DERIVATIVE 5 [IU]/.1ML
INJECTION, SOLUTION INTRADERMAL
COMMUNITY

## 2025-01-09 ENCOUNTER — APPOINTMENT (OUTPATIENT)
Dept: PRIMARY CARE | Facility: CLINIC | Age: 25
End: 2025-01-09
Payer: COMMERCIAL